# Patient Record
Sex: MALE | Race: WHITE | URBAN - METROPOLITAN AREA
[De-identification: names, ages, dates, MRNs, and addresses within clinical notes are randomized per-mention and may not be internally consistent; named-entity substitution may affect disease eponyms.]

---

## 2022-11-16 NOTE — PROGRESS NOTES
"  Assessment & Plan   Problem List Items Addressed This Visit    None  Visit Diagnoses     Sinusitis, unspecified chronicity, unspecified location    -  Primary    Relevant Medications    amoxicillin-clavulanate (AUGMENTIN) 875-125 MG tablet         Based on history and exam I have concerns for possible bacterial infection. Will treat as noted above. Encouraged Jonas to contact me with any concerns and to return for annual physical at his convenience.     27 minutes spent on the date of the encounter doing chart review, history and exam, documentation and further activities as noted.    Spencer Lemos MD  [facility]    Subjective   Jonas is a 27 year old presenting for the following health issues:  Establish Care and Nasal Congestion (Started last Monday and cough started Wednesday and has gotten worse/Cough is productive with green mucus /Sore throat on and off /No fever )      HPI   New Patient  Establish Care  Acute Issue  Congestion  - symptoms started last Monday, at first mostly nasal congestion  - cough started Wednesday  - thick posterior drainage  - reports coughing up thick, green discharge consistently  - no fever, chills, nausea, sore throat, fatigue    Review of Systems   Constitutional, HEENT, cardiovascular, pulmonary, gi and gu systems are negative, except as otherwise noted.      Objective    /69 (BP Location: Right arm, Patient Position: Sitting, Cuff Size: Adult Regular)   Pulse 83   Temp 98  F (36.7  C) (Oral)   Resp 15   Ht 1.877 m (6' 1.9\")   Wt 91.6 kg (202 lb)   SpO2 96%   BMI 26.01 kg/m    There is no height or weight on file to calculate BMI.  Physical Exam   GENERAL: healthy, alert and no distress  NECK: no adenopathy, no asymmetry, masses, or scars and thyroid normal to palpation  RESP: cough, lungs clear to auscultation - no rales, rhonchi or wheezes  CV: regular rate and rhythm, normal S1 S2, no S3 or S4, no murmur, click or rub, no peripheral edema and peripheral " pulses strong  ABDOMEN: soft, nontender, no hepatosplenomegaly, no masses and bowel sounds normal  MS: no gross musculoskeletal defects noted, no edema

## 2022-11-17 ENCOUNTER — OFFICE VISIT (OUTPATIENT)
Dept: FAMILY MEDICINE | Facility: CLINIC | Age: 27
End: 2022-11-17
Payer: COMMERCIAL

## 2022-11-17 VITALS
OXYGEN SATURATION: 96 % | DIASTOLIC BLOOD PRESSURE: 69 MMHG | RESPIRATION RATE: 15 BRPM | SYSTOLIC BLOOD PRESSURE: 116 MMHG | HEIGHT: 74 IN | WEIGHT: 202 LBS | TEMPERATURE: 98 F | BODY MASS INDEX: 25.93 KG/M2 | HEART RATE: 83 BPM

## 2022-11-17 DIAGNOSIS — J32.9 SINUSITIS, UNSPECIFIED CHRONICITY, UNSPECIFIED LOCATION: Primary | ICD-10-CM

## 2022-11-17 RX ORDER — GLYCOPYRROLATE 1 MG/1
1 TABLET ORAL 2 TIMES DAILY
COMMUNITY

## 2023-12-11 ENCOUNTER — OFFICE VISIT (OUTPATIENT)
Dept: FAMILY MEDICINE | Facility: CLINIC | Age: 28
End: 2023-12-11
Payer: COMMERCIAL

## 2023-12-11 VITALS
BODY MASS INDEX: 25.23 KG/M2 | HEART RATE: 58 BPM | OXYGEN SATURATION: 100 % | SYSTOLIC BLOOD PRESSURE: 108 MMHG | WEIGHT: 196 LBS | RESPIRATION RATE: 16 BRPM | DIASTOLIC BLOOD PRESSURE: 73 MMHG | TEMPERATURE: 97.9 F

## 2023-12-11 DIAGNOSIS — F32.A ANXIETY AND DEPRESSION: Primary | ICD-10-CM

## 2023-12-11 DIAGNOSIS — F41.9 ANXIETY AND DEPRESSION: Primary | ICD-10-CM

## 2023-12-11 DIAGNOSIS — F90.9 ATTENTION DEFICIT HYPERACTIVITY DISORDER (ADHD), UNSPECIFIED ADHD TYPE: ICD-10-CM

## 2023-12-11 RX ORDER — ESCITALOPRAM OXALATE 10 MG/1
10 TABLET ORAL DAILY
Qty: 60 TABLET | Refills: 1 | Status: SHIPPED | OUTPATIENT
Start: 2023-12-11 | End: 2024-02-08

## 2023-12-11 ASSESSMENT — PATIENT HEALTH QUESTIONNAIRE - PHQ9
5. POOR APPETITE OR OVEREATING: SEVERAL DAYS
SUM OF ALL RESPONSES TO PHQ QUESTIONS 1-9: 7

## 2023-12-11 ASSESSMENT — ANXIETY QUESTIONNAIRES
GAD7 TOTAL SCORE: 9
6. BECOMING EASILY ANNOYED OR IRRITABLE: MORE THAN HALF THE DAYS
IF YOU CHECKED OFF ANY PROBLEMS ON THIS QUESTIONNAIRE, HOW DIFFICULT HAVE THESE PROBLEMS MADE IT FOR YOU TO DO YOUR WORK, TAKE CARE OF THINGS AT HOME, OR GET ALONG WITH OTHER PEOPLE: SOMEWHAT DIFFICULT
3. WORRYING TOO MUCH ABOUT DIFFERENT THINGS: MORE THAN HALF THE DAYS
1. FEELING NERVOUS, ANXIOUS, OR ON EDGE: MORE THAN HALF THE DAYS
5. BEING SO RESTLESS THAT IT IS HARD TO SIT STILL: SEVERAL DAYS
GAD7 TOTAL SCORE: 9
7. FEELING AFRAID AS IF SOMETHING AWFUL MIGHT HAPPEN: NOT AT ALL
2. NOT BEING ABLE TO STOP OR CONTROL WORRYING: SEVERAL DAYS

## 2023-12-11 NOTE — PATIENT INSTRUCTIONS
-  ASSESSMENT and PLAN:    - Depression with Anxiety. Prozac not helping as it used to.   Will stop Prozac and start Lexapro at 10 mg/day  Discussed that he may feel off for a week or so.  Schedule a visit, can be video in another 1-2 weeks to discuss dose adjustment  Continue exercise  Encouraged morning light for at least 30 minutes. Also, outdoor daylight exercise when possible.  Start Vit D 1-2000 international unit(s)/day      - Question of ADHD, mixed type per his report  Sent for Neuropsych testing          Follow-up within next 2 months    Triston Malhotra MD  Family Medicine and Sports Medicine  [facility]

## 2023-12-11 NOTE — PROGRESS NOTES
Medical assistant intake:  Jonas Allison is a 28 year old male who presents to [facility] today for Recheck Medication (Was seeing a PCP in [place], needs a local PCP. ) and Consult (Referral for ADHD testing.)       -  ASSESSMENT and PLAN:    - Depression with Anxiety. Prozac not helping as it used to.   Will stop Prozac and start Lexapro at 10 mg/day  Discussed that he may feel off for a week or so.  Schedule a visit, can be video in another 1-2 weeks to discuss dose adjustment  Continue exercise  Encouraged morning light for at least 30 minutes. Also, outdoor daylight exercise when possible.  Start Vit D 1-2000 international unit(s)/day  Continue regular counseling      - Question of ADHD, mixed type per his report  Sent for Neuropsych testing          Follow-up within next 2 months    Triston Malhotra MD  Family Medicine and Sports Medicine  [facility]      SUBJECTIVE:   Jonas is a 27 yo who is from [place]. He moved here in summer of 2022.   His PMH includes Anxiety and Depression, on Fluoxetine. It helps a bit, but not satisfactorily. Interested in changing medications.    He has counseling and has discussed this at length with his counselor.    His PHQ-9 score is at 7.  No feelings of self-harm.  His LINDA-7 score is at 9.    Review Of Systems:      Has otherwise been in usual state of health, e.g.   Cardiovascular: negative  Respiratory: No shortness of breath, dyspnea on exertion, cough, or hemoptysis  Gastrointestinal: negative  Genitourinary: negative    Drinks alcohol about 1 time/week,  Using THC about 3 times/week.  Exercising 4-5 times/week.     Problem list per EMR:  There is no problem list on file for this patient.      Current Outpatient Medications   Medication Sig Dispense Refill    escitalopram (LEXAPRO) 10 MG tablet Take 1 tablet (10 mg) by mouth daily 60 tablet 1    glycopyrrolate (ROBINUL) 1 MG tablet Take 1 mg by mouth 2 times daily Twice as day as needed         No Known  Allergies     Social:     He works for [employer].  Has a steady girlfriend who lives in [place] so it is long distance and that is not ideal.    OBJECTIVE    Vitals: /73 (BP Location: Left arm, Patient Position: Sitting, Cuff Size: Adult Large)   Pulse 58   Temp 97.9  F (36.6  C) (Temporal)   Resp 16   Wt 88.9 kg (196 lb)   SpO2 100%   BMI 25.23 kg/m    BMI= Body mass index is 25.23 kg/m .    He appears well in no distress.  Normal eye contact and conversational tone.  He does not seem agitated.  He appears healthy.  Normal gait.  See above for PHQ-9 and LINDA-7 scores.    SEE TOP OF NOTE FOR ASSESSMENT AND PLAN    --Triston Malhotra MD  [facility], Department of Family Medicine and Community Health

## 2023-12-11 NOTE — NURSING NOTE
"28 year old  Chief Complaint   Patient presents with    Recheck Medication     Was seeing a PCP in [place], needs a local PCP.     Consult     Referral for ADHD testing.       Blood pressure 108/73, pulse 58, temperature 97.9  F (36.6  C), temperature source Temporal, resp. rate 16, weight 88.9 kg (196 lb), SpO2 100%. Body mass index is 25.23 kg/m .  There is no problem list on file for this patient.      Wt Readings from Last 2 Encounters:   12/11/23 88.9 kg (196 lb)   11/17/22 91.6 kg (202 lb)     BP Readings from Last 3 Encounters:   12/11/23 108/73   11/17/22 116/69         Current Outpatient Medications   Medication    FLUoxetine (PROZAC) 20 MG capsule    glycopyrrolate (ROBINUL) 1 MG tablet    amoxicillin-clavulanate (AUGMENTIN) 875-125 MG tablet     No current facility-administered medications for this visit.       Social History     Tobacco Use    Smoking status: Never    Smokeless tobacco: Never   Vaping Use    Vaping Use: Never used   Substance Use Topics    Alcohol use: Yes     Comment: socially    Drug use: Yes     Types: Marijuana       Health Maintenance Due   Topic Date Due    ADVANCE CARE PLANNING  Never done    HIV SCREENING  Never done    HEPATITIS C SCREENING  Never done    PHQ-2 (once per calendar year)  01/01/2023    COVID-19 Vaccine (3 - 2023-24 season) 09/01/2023       No results found for: \"PAP\"      December 11, 2023 3:55 PM    "

## 2024-02-08 DIAGNOSIS — F41.9 ANXIETY AND DEPRESSION: ICD-10-CM

## 2024-02-08 DIAGNOSIS — F32.A ANXIETY AND DEPRESSION: ICD-10-CM

## 2024-02-08 RX ORDER — ESCITALOPRAM OXALATE 10 MG/1
10 TABLET ORAL DAILY
Qty: 60 TABLET | Refills: 1 | Status: SHIPPED | OUTPATIENT
Start: 2024-02-08

## 2024-02-08 NOTE — TELEPHONE ENCOUNTER
Medication requested: escitalopram (LEXAPRO) 10 MG tablet   Last office visit: 12/11/2023  [facility] appointments: none  Medication last refilled: 12/11/2023; 60 + 1 refill  Last qualifying labs:      12/11/2023  3:57 PM   PHQ-9 and GAD7 Scores    PHQ-9 Total Score 7    LINDA-7 Total Score 9      Prescription approved per [facility] Refill Protocol.    LOLITA Zelaya, RN  02/08/24, 4:00 PM